# Patient Record
Sex: MALE | Race: WHITE | NOT HISPANIC OR LATINO | Employment: OTHER | URBAN - NONMETROPOLITAN AREA
[De-identification: names, ages, dates, MRNs, and addresses within clinical notes are randomized per-mention and may not be internally consistent; named-entity substitution may affect disease eponyms.]

---

## 2017-07-25 ENCOUNTER — OFFICE VISIT (OUTPATIENT)
Dept: GASTROENTEROLOGY | Facility: CLINIC | Age: 57
End: 2017-07-25

## 2017-07-25 VITALS
HEART RATE: 105 BPM | DIASTOLIC BLOOD PRESSURE: 90 MMHG | WEIGHT: 260 LBS | BODY MASS INDEX: 34.46 KG/M2 | HEIGHT: 73 IN | TEMPERATURE: 97.8 F | SYSTOLIC BLOOD PRESSURE: 122 MMHG | OXYGEN SATURATION: 97 %

## 2017-07-25 DIAGNOSIS — Z83.71 FAMILY HX COLONIC POLYPS: ICD-10-CM

## 2017-07-25 DIAGNOSIS — R19.5 HEME POSITIVE STOOL: ICD-10-CM

## 2017-07-25 DIAGNOSIS — I10 ESSENTIAL HYPERTENSION: ICD-10-CM

## 2017-07-25 DIAGNOSIS — Z86.010 HX OF COLONIC POLYP: Primary | ICD-10-CM

## 2017-07-25 PROCEDURE — 99203 OFFICE O/P NEW LOW 30 MIN: CPT | Performed by: NURSE PRACTITIONER

## 2017-07-25 RX ORDER — IRBESARTAN 150 MG/1
TABLET ORAL
Refills: 5 | COMMUNITY
Start: 2017-07-05

## 2017-07-25 RX ORDER — LORATADINE 10 MG/1
10 TABLET ORAL DAILY
COMMUNITY
End: 2020-08-11

## 2017-07-25 NOTE — PROGRESS NOTES
[facility] GASTROENTEROLOGY - OFFICE NOTE    7/25/2017    Daniel Euceda   1960    Primary Physician: Erasmo Oswald PA-C    Chief Complaint   Patient presents with   • GI Bleeding     poss stool cards         HISTORY OF PRESENT ILLNESS    Daniel Euceda is a 56 y.o. male presents  with  Heme pos stool noted on rectal exam 5/2017, by pcp.  No sign of active gi bleeding.  Has occasional loose stools if eats tomatoes.   No abdominal pain. No nausea or vomiting.  No fever. No weight loss.  Last colonoscopy was 7/2011, noting polyps, recall rec 3 yrs. There is family h/o colon polyps.     Past Medical History:   Diagnosis Date   • Asthma    • Colon polyp    • GERD (gastroesophageal reflux disease)    • Hypertension    • Prostatitis        Past Surgical History:   Procedure Laterality Date   • COLONOSCOPY  02/07/2011       Outpatient Prescriptions Marked as Taking for the 7/25/17 encounter (Office Visit) with JESSICA Quintanilla   Medication Sig Dispense Refill   • esomeprazole (nexIUM) 20 MG capsule Take 20 mg by mouth Every Morning Before Breakfast.     • irbesartan (AVAPRO) 150 MG tablet TAKE 1 TABLET BY MOUTH DAILY  5   • loratadine (ALLERGY) 10 MG tablet Take 10 mg by mouth Daily.         No Known Allergies    Social History     Social History   • Marital status:      Spouse name: N/A   • Number of children: N/A   • Years of education: N/A     Occupational History   • Not on file.     Social History Main Topics   • Smoking status: Former Smoker   • Smokeless tobacco: Never Used   • Alcohol use Yes      Comment: rare   • Drug use: No   • Sexual activity: Not on file     Other Topics Concern   • Not on file     Social History Narrative       Family History   Problem Relation Age of Onset   • Colon polyps Mother    • Colon cancer Neg Hx        Review of Systems   Constitutional: Negative for appetite change, chills, fatigue, fever and unexpected weight change.   HENT: Negative for sore throat and  "trouble swallowing.    Respiratory: Negative for cough, chest tightness, shortness of breath and wheezing.    Cardiovascular: Negative for chest pain and palpitations.   Gastrointestinal: Negative for abdominal distention, abdominal pain, anal bleeding, blood in stool, constipation, nausea, rectal pain and vomiting.        As mentioned in hpi   Genitourinary: Negative for difficulty urinating, dysuria and hematuria.   Musculoskeletal: Negative for arthralgias and back pain.   Skin: Negative for color change and rash.   Neurological: Negative for dizziness, syncope, light-headedness and headaches.   Psychiatric/Behavioral: Negative for confusion. The patient is not nervous/anxious.        Vitals:    07/25/17 1402   BP: 122/90   BP Location: Left arm   Patient Position: Sitting   Cuff Size: Adult   Pulse: 105   Temp: 97.8 °F (36.6 °C)   SpO2: 97%   Weight: 260 lb (118 kg)   Height: 73\" (185.4 cm)      Body mass index is 34.3 kg/(m^2).    Physical Exam   Constitutional: He appears well-developed and well-nourished. No distress.   HENT:   Head: Normocephalic and atraumatic.   Eyes: EOM are normal. No scleral icterus.   Neck: Normal range of motion. Neck supple. No JVD present. No tracheal deviation present.   Cardiovascular: Normal rate and regular rhythm.    Pulmonary/Chest: Effort normal and breath sounds normal. No stridor.   Abdominal: Soft. Bowel sounds are normal. He exhibits no distension and no mass. There is no tenderness. There is no rebound and no guarding.   Musculoskeletal: He exhibits no edema.   Neurological: He is alert.   Skin: Skin is warm and dry. No rash noted.   Psychiatric: He has a normal mood and affect. His behavior is normal.   Vitals reviewed.      No results found for this or any previous visit.        ASSESSMENT AND PLAN    Daniel was seen today for gi bleeding.    Diagnoses and all orders for this visit:    Hx of colonic polyp    Heme positive stool  -     Case Request; Standing  -     Case " Request    Family hx colonic polyps    Essential hypertension    Other orders  -     Implement Anesthesia Orders Day of Procedure; Standing  -     Obtain Informed Consent; Standing  -     Sod Picosulfate-Mag Ox-Cit Acd (PREPOPIK) 10-3.5-12 MG-GM-GM pack; Take 1 container by mouth 1 (One) Time for 1 dose. Take as directed per instruction sheet    plan for colonoscopy. Instructed to take heart or b/p med am of procedure if that is when he normally takes.     COLONOSCOPY WITH ANESTHESIA (N/A)   All risks, benefits, alternatives, and indications of colonoscopy procedure have been discussed with the patient. Risks to include perforation of the colon requiring possible surgery or colostomy, risk of bleeding from biopsies or removal of colon tissue, possibility of missing a colon polyp or cancer, or adverse drug reaction.  Benefits to include the diagnosis and management of disease of the colon and rectum. Alternatives to include barium enema, radiographic evaluation, lab testing or no intervention. Pt verbalizes understanding and agrees.         Body mass index is 34.3 kg/(m^2).         JESSICA Oliveros Dragon/transcription disclaimer:  Much of this encounter note is electronic transcription/translation of spoken language to printed text.  The electronic translation of spoken language may be erroneous, or at times, nonsensical words or phrases may be inadvertently transcribed.  Although I have reviewed the note for such errors, some may still exist.

## 2017-08-30 ENCOUNTER — HOSPITAL ENCOUNTER (OUTPATIENT)
Facility: HOSPITAL | Age: 57
Setting detail: HOSPITAL OUTPATIENT SURGERY
Discharge: HOME | End: 2017-08-30
Attending: INTERNAL MEDICINE | Admitting: INTERNAL MEDICINE

## 2017-08-30 ENCOUNTER — ANESTHESIA (OUTPATIENT)
Dept: GASTROENTEROLOGY | Facility: HOSPITAL | Age: 57
End: 2017-08-30

## 2017-08-30 ENCOUNTER — ANESTHESIA EVENT (OUTPATIENT)
Dept: GASTROENTEROLOGY | Facility: HOSPITAL | Age: 57
End: 2017-08-30

## 2017-08-30 VITALS
HEART RATE: 82 BPM | DIASTOLIC BLOOD PRESSURE: 94 MMHG | WEIGHT: 254 LBS | HEIGHT: 73 IN | OXYGEN SATURATION: 98 % | RESPIRATION RATE: 16 BRPM | SYSTOLIC BLOOD PRESSURE: 127 MMHG | BODY MASS INDEX: 33.66 KG/M2 | TEMPERATURE: 98.1 F

## 2017-08-30 DIAGNOSIS — R19.5 HEME POSITIVE STOOL: ICD-10-CM

## 2017-08-30 PROCEDURE — 45385 COLONOSCOPY W/LESION REMOVAL: CPT | Performed by: INTERNAL MEDICINE

## 2017-08-30 PROCEDURE — 88305 TISSUE EXAM BY PATHOLOGIST: CPT | Performed by: INTERNAL MEDICINE

## 2017-08-30 PROCEDURE — 25010000002 PROPOFOL 10 MG/ML EMULSION: Performed by: NURSE ANESTHETIST, CERTIFIED REGISTERED

## 2017-08-30 DEVICE — DEV CLIP ENDO RESOLUTION360 CONTRL ROT 235CM: Type: IMPLANTABLE DEVICE | Status: FUNCTIONAL

## 2017-08-30 RX ORDER — SODIUM CHLORIDE 0.9 % (FLUSH) 0.9 %
3 SYRINGE (ML) INJECTION AS NEEDED
Status: DISCONTINUED | OUTPATIENT
Start: 2017-08-30 | End: 2017-08-30 | Stop reason: HOSPADM

## 2017-08-30 RX ORDER — SODIUM CHLORIDE 9 MG/ML
100 INJECTION, SOLUTION INTRAVENOUS CONTINUOUS
Status: CANCELLED | OUTPATIENT
Start: 2017-08-30

## 2017-08-30 RX ORDER — SODIUM CHLORIDE 9 MG/ML
500 INJECTION, SOLUTION INTRAVENOUS CONTINUOUS PRN
Status: DISCONTINUED | OUTPATIENT
Start: 2017-08-30 | End: 2017-08-30 | Stop reason: HOSPADM

## 2017-08-30 RX ORDER — ONDANSETRON 2 MG/ML
4 INJECTION INTRAMUSCULAR; INTRAVENOUS ONCE AS NEEDED
Status: DISCONTINUED | OUTPATIENT
Start: 2017-08-30 | End: 2017-08-30 | Stop reason: HOSPADM

## 2017-08-30 RX ORDER — PROPOFOL 10 MG/ML
VIAL (ML) INTRAVENOUS AS NEEDED
Status: DISCONTINUED | OUTPATIENT
Start: 2017-08-30 | End: 2017-08-30 | Stop reason: SURG

## 2017-08-30 RX ORDER — SODIUM CHLORIDE 0.9 % (FLUSH) 0.9 %
1-10 SYRINGE (ML) INJECTION AS NEEDED
Status: CANCELLED | OUTPATIENT
Start: 2017-08-30

## 2017-08-30 RX ORDER — LIDOCAINE HYDROCHLORIDE 10 MG/ML
0.5 INJECTION, SOLUTION INFILTRATION; PERINEURAL ONCE AS NEEDED
Status: COMPLETED | OUTPATIENT
Start: 2017-08-30 | End: 2017-08-30

## 2017-08-30 RX ADMIN — PROPOFOL 40 MG: 10 INJECTION, EMULSION INTRAVENOUS at 09:46

## 2017-08-30 RX ADMIN — PROPOFOL 20 MG: 10 INJECTION, EMULSION INTRAVENOUS at 10:00

## 2017-08-30 RX ADMIN — PROPOFOL 40 MG: 10 INJECTION, EMULSION INTRAVENOUS at 09:51

## 2017-08-30 RX ADMIN — PROPOFOL 20 MG: 10 INJECTION, EMULSION INTRAVENOUS at 09:57

## 2017-08-30 RX ADMIN — PROPOFOL 20 MG: 10 INJECTION, EMULSION INTRAVENOUS at 09:54

## 2017-08-30 RX ADMIN — PROPOFOL 60 MG: 10 INJECTION, EMULSION INTRAVENOUS at 09:43

## 2017-08-30 RX ADMIN — PROPOFOL 40 MG: 10 INJECTION, EMULSION INTRAVENOUS at 09:49

## 2017-08-30 RX ADMIN — LIDOCAINE HYDROCHLORIDE 0.5 ML: 10 INJECTION, SOLUTION INFILTRATION; PERINEURAL at 09:11

## 2017-08-30 RX ADMIN — SODIUM CHLORIDE 500 ML: 9 INJECTION, SOLUTION INTRAVENOUS at 09:11

## 2017-08-30 NOTE — ANESTHESIA POSTPROCEDURE EVALUATION
Patient: Daniel Euceda    Procedure Summary     Date Anesthesia Start Anesthesia Stop Room / Location    08/30/17 0940 [facility] ENDOSCOPY 6 / [facility]       Procedure Diagnosis Surgeon Provider    COLONOSCOPY WITH ANESTHESIA (N/A ) Heme positive stool  (Heme positive stool [R19.5]) MD Edgar Raines, ELIZABETH          Anesthesia Type: general  Last vitals  BP        Temp        Pulse       Resp        SpO2          Post Anesthesia Care and Evaluation    Patient location during evaluation: PHASE II  Patient participation: complete - patient participated  Level of consciousness: awake and alert  Pain score: 0  Pain management: adequate  Airway patency: patent  Anesthetic complications: No anesthetic complications  PONV Status: none  Cardiovascular status: acceptable and stable  Respiratory status: acceptable and unassisted  Hydration status: acceptable

## 2017-08-30 NOTE — ANESTHESIA PREPROCEDURE EVALUATION
Anesthesia Evaluation     Patient summary reviewed and Nursing notes reviewed   no history of anesthetic complications:  NPO Solid Status: > 8 hours  NPO Liquid Status: > 4 hours     Airway   Mallampati: I  TM distance: >3 FB  Neck ROM: full  no difficulty expected  Dental - normal exam     Pulmonary    (+) asthma (Hasn't used rescue inhaler in years),   (-) COPD, recent URI, sleep apnea, not a smoker  Cardiovascular   Exercise tolerance: good (4-7 METS)    Rhythm: regular  Rate: normal    (+) hypertension well controlled,   (-) pacemaker, past MI, angina, cardiac stents, CABG      Neuro/Psych  (-) seizures, TIA, CVA  GI/Hepatic/Renal/Endo    (+)  GERD well controlled,   (-) liver disease, no renal disease, diabetes, hypothyroidism, hyperthyroidism    Musculoskeletal     (-) neck pain, neck stiffness  Abdominal    Substance History      OB/GYN          Other                                        Anesthesia Plan    ASA 2     general     intravenous induction   Anesthetic plan and risks discussed with patient.

## 2017-09-01 LAB
CYTO UR: NORMAL
LAB AP CASE REPORT: NORMAL
LAB AP CLINICAL INFORMATION: NORMAL
Lab: NORMAL
PATH REPORT.FINAL DX SPEC: NORMAL
PATH REPORT.GROSS SPEC: NORMAL

## 2018-04-16 ENCOUNTER — PROCEDURE VISIT (OUTPATIENT)
Dept: OTOLARYNGOLOGY | Facility: CLINIC | Age: 58
End: 2018-04-16

## 2018-04-16 ENCOUNTER — OFFICE VISIT (OUTPATIENT)
Dept: OTOLARYNGOLOGY | Facility: CLINIC | Age: 58
End: 2018-04-16

## 2018-04-16 VITALS
HEART RATE: 68 BPM | TEMPERATURE: 98.9 F | SYSTOLIC BLOOD PRESSURE: 143 MMHG | DIASTOLIC BLOOD PRESSURE: 88 MMHG | WEIGHT: 268 LBS | HEIGHT: 73 IN | BODY MASS INDEX: 35.52 KG/M2

## 2018-04-16 DIAGNOSIS — H93.11 TINNITUS OF RIGHT EAR: ICD-10-CM

## 2018-04-16 DIAGNOSIS — H90.41 SENSORINEURAL HEARING LOSS (SNHL) OF RIGHT EAR WITH UNRESTRICTED HEARING OF LEFT EAR: Primary | ICD-10-CM

## 2018-04-16 DIAGNOSIS — H90.3 ASYMMETRICAL SENSORINEURAL HEARING LOSS: Primary | ICD-10-CM

## 2018-04-16 PROCEDURE — 92557 COMPREHENSIVE HEARING TEST: CPT | Performed by: AUDIOLOGIST-HEARING AID FITTER

## 2018-04-16 PROCEDURE — 99214 OFFICE O/P EST MOD 30 MIN: CPT | Performed by: NURSE PRACTITIONER

## 2018-04-16 PROCEDURE — 92568 ACOUSTIC REFL THRESHOLD TST: CPT | Performed by: AUDIOLOGIST-HEARING AID FITTER

## 2018-04-16 NOTE — PATIENT INSTRUCTIONS
(1) See the medical provider as scheduled due to the unilateral symptoms. Imaging studies may be warranted to rule out retrocochlear pathology.  (2) Receive audiological testing annually, sooner if needed.

## 2018-04-16 NOTE — PROGRESS NOTES
YOB: 1960  Location: [facility]  Location Address: [address] [place] [address], [facility], [address] [place] [address]  Location Phone: [phone]    Chief Complaint   Patient presents with   • Tinnitus       History of Present Illness  Daniel Euceda is a 57 y.o. male.  Daniel Euceda is here for evaluation of ENT complaints. The patient has had problems with decreased hearing and tinnitus  The symptoms are not localized to a particular location. The patient has had moderate symptoms. The symptoms have been present for the last several years The symptoms are aggravated by  no identifiable factors. The symptoms are improved by no identifiable factors.  He reports worsening right sided tinnitus, hearing loss gradually.  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  He denies headaches, vertigo, otalgia.     Procedure visit     2018  [facility]   YOEL Alatorre   Audiology   Sensorineural hearing loss (SNHL) of right ear with unrestricted hearing of left ear +1 more   Dx   Hearing Loss, Tinnitus; Referred by Erasmo Oswald PA-C   Reason for Visit    Progress Notes                      Past Medical History:   Diagnosis Date   • Asthma    • Colon polyp    • GERD (gastroesophageal reflux disease)    • Hypertension    • Prostatitis    • Tinnitus        Past Surgical History:   Procedure Laterality Date   • COLONOSCOPY  2011   • COLONOSCOPY N/A 2017    Procedure: COLONOSCOPY WITH ANESTHESIA;  Surgeon: Carroll Byrnes MD;  Location: [facility] ENDOSCOPY;  Service:    • EYE SURGERY         Outpatient Prescriptions Marked as Taking for the 18 encounter (Office Visit) with JESSICA Delvalle   Medication Sig Dispense Refill   • esomeprazole (nexIUM) 20 MG capsule Take 20 mg by mouth Every Morning Before Breakfast.     • irbesartan (AVAPRO) 150 MG tablet TAKE 1 TABLET BY MOUTH DAILY  5   • loratadine (ALLERGY) 10 MG tablet Take 10 mg by mouth Daily.          Review of patient's allergies indicates no known allergies.    Family History   Problem Relation Age of Onset   • Colon polyps Mother    • Colon cancer Neg Hx        Social History     Social History   • Marital status:      Spouse name: N/A   • Number of children: N/A   • Years of education: N/A     Occupational History   • Not on file.     Social History Main Topics   • Smoking status: Former Smoker     Types: Cigarettes   • Smokeless tobacco: Never Used   • Alcohol use Yes      Comment: rare   • Drug use: No   • Sexual activity: Not on file     Other Topics Concern   • Not on file     Social History Narrative   • No narrative on file       Review of Systems   Constitutional: Negative.    HENT:        SEE HPI   Eyes: Negative.    Respiratory: Negative.    Cardiovascular: Negative.    Gastrointestinal: Negative.    Endocrine: Negative.    Genitourinary: Negative.    Musculoskeletal: Negative.    Skin: Negative.    Allergic/Immunologic: Negative.    Neurological: Negative.    Hematological: Negative.    Psychiatric/Behavioral: Negative.        Vitals:    04/16/18 1404   BP: 143/88   Pulse: 68   Temp: 98.9 °F (37.2 °C)       Body mass index is 35.36 kg/m².    Objective     Physical Exam  CONSTITUTIONAL: well nourished, alert, oriented, in no acute distress     COMMUNICATION AND VOICE: able to communicate normally, normal voice quality    HEAD: normocephalic, no lesions, atraumatic, no tenderness, no masses     FACE: appearance normal, no lesions, no tenderness, no deformities, facial motion symmetric    SALIVARY GLANDS: parotid glands with no tenderness, no swelling, no masses, submandibular glands with normal size, nontender    EYES: ocular motility normal, eyelids normal, orbits normal, no proptosis, conjunctiva normal , pupils equal, round     EARS:  Hearing: response to conversational voice normal bilaterally   External Ears: auricles without lesions  Otoscopic: tympanic membrane appearance normal,  no lesions, no perforation, normal mobility, no fluid    NOSE:  External Nose: structure normal, no tenderness on palpation, no nasal discharge, no lesions, no evidence of trauma, nostrils patent   Intranasal Exam: nasal mucosa normal, vestibule within normal limits, inferior turbinate normal, nasal septum midline     ORAL:  Lips: upper and lower lips without lesion   Teeth: dentition within normal limits for age   Gums: gingivae healthy   Oral Mucosa: oral mucosa normal, no mucosal lesions   Floor of Mouth: Warthin’s duct patent, mucosa normal  Tongue: lingual mucosa normal without lesions, normal tongue mobility   Palate: soft and hard palates with normal mucosa and structure  Oropharynx: oropharyngeal mucosa normal    NECK: neck appearance normal, no mass,  noted without erythema or tenderness    THYROID: no overt thyromegaly, no tenderness, nodules or mass present on palpation, position midline     LYMPH NODES: no lymphadenopathy    CHEST/RESPIRATORY: respiratory effort normal    CARDIOVASCULAR:  extremities without cyanosis or edema      NEUROLOGIC/PSYCHIATRIC: oriented to time, place and person, mood normal, affect appropriate, CN II-XII intact grossly    Assessment/Plan   Daniel was seen today for tinnitus.    Diagnoses and all orders for this visit:    Asymmetrical sensorineural hearing loss  -     Comprehensive Hearing Test; Future    Tinnitus of right ear      * Surgery not found *  Orders Placed This Encounter   Procedures   • Comprehensive Hearing Test     Standing Status:   Future     Standing Expiration Date:   4/16/2019     Order Specific Question:   Laterality     Answer:   Bilateral     Return in about 1 year (around 4/16/2019).       Patient Instructions   Avoid loud noise exposure. Protect hearing in with extreme loud noise.   Recommend fan, sound machine, white noise from TV for tinnitus masking. Avoid excessive caffeine, decrease stress level, monitor BP regularly, routine sleep schedule. Low  Sodium Diet.   Tinnitus Handout   Hearing loss handout   Appt with hearing aid specialist for hearing aid fitting when patient so desires.   Call for change or worsening symptoms not controlled by current treatment regimen.        Tinnitus  Tinnitus refers to hearing a sound when there is no actual source for that sound. This is often described as ringing in the ears. However, people with this condition may hear a variety of noises. A person may hear the sound in one ear or in both ears.  The sounds of tinnitus can be soft, loud, or somewhere in between. Tinnitus can last for a few seconds or can be constant for days. It may go away without treatment and come back at various times. When tinnitus is constant or happens often, it can lead to other problems, such as trouble sleeping and trouble concentrating.  Almost everyone experiences tinnitus at some point. Tinnitus that is long-lasting (chronic) or comes back often is a problem that may require medical attention.  What are the causes?  The cause of tinnitus is often not known. In some cases, it can result from other problems or conditions, including:  · Exposure to loud noises from machinery, music, or other sources.  · Hearing loss.  · Ear or sinus infections.  · Earwax buildup.  · A foreign object in the ear.  · Use of certain medicines.  · Use of alcohol and caffeine.  · High blood pressure.  · Heart diseases.  · Anemia.  · Allergies.  · Meniere disease.  · Thyroid problems.  · Tumors.  · An enlarged part of a weakened blood vessel (aneurysm).  What are the signs or symptoms?  The main symptom of tinnitus is hearing a sound when there is no source for that sound. It may sound like:  · Buzzing.  · Roaring.  · Ringing.  · Blowing air, similar to the sound heard when you listen to a seashell.  · Hissing.  · Whistling.  · Sizzling.  · Humming.  · Running water.  · A sustained musical note.  How is this diagnosed?  Tinnitus is diagnosed based on your symptoms. Your  health care provider will do a physical exam. A comprehensive hearing exam (audiologic exam) will be done if your tinnitus:  · Affects only one ear (unilateral).  · Causes hearing difficulties.  · Lasts 6 months or longer.  You may also need to see a health care provider who specializes in hearing disorders (audiologist). You may be asked to complete a questionnaire to determine the severity of your tinnitus. Tests may be done to help determine the cause and to rule out other conditions. These can include:  · Imaging studies of your head and brain, such as:  ¨ A CT scan.  ¨ An MRI.  · An imaging study of your blood vessels (angiogram).  How is this treated?  Treating an underlying medical condition can sometimes make tinnitus go away. If your tinnitus continues, other treatments may include:  · Medicines, such as certain antidepressants or sleeping aids.  · Sound generators to mask the tinnitus. These include:  ¨ Tabletop sound machines that play relaxing sounds to help you fall asleep.  ¨ Wearable devices that fit in your ear and play sounds or music.  ¨ A small device that uses headphones to deliver a signal embedded in music (acoustic neural stimulation). In time, this may change the pathways of your brain and make you less sensitive to tinnitus. This device is used for very severe cases when no other treatment is working.  · Therapy and counseling to help you manage the stress of living with tinnitus.  · Using hearing aids or cochlear implants, if your tinnitus is related to hearing loss.  Follow these instructions at home:  · When possible, avoid being in loud places and being exposed to loud sounds.  · Wear hearing protection, such as earplugs, when you are exposed to loud noises.  · Do not take stimulants, such as nicotine, alcohol, or caffeine.  · Practice techniques for reducing stress, such as meditation, yoga, or deep breathing.  · Use a white noise machine, a humidifier, or other devices to mask the  sound of tinnitus.  · Sleep with your head slightly raised. This may reduce the impact of tinnitus.  · Try to get plenty of rest each night.  Contact a health care provider if:  · You have tinnitus in just one ear.  · Your tinnitus continues for 3 weeks or longer without stopping.  · Home care measures are not helping.  · You have tinnitus after a head injury.  · You have tinnitus along with any of the following:  ¨ Dizziness.  ¨ Loss of balance.  ¨ Nausea and vomiting.  This information is not intended to replace advice given to you by your health care provider. Make sure you discuss any questions you have with your health care provider.  Document Released: 12/18/2006 Document Revised: 08/20/2017 Document Reviewed: 05/20/2015  [employer] Interactive Patient Education © 2017 [employer]      Recommend MRI BRAIN/IACs - pt defers at this time.      Call for problems or worsening symptoms    Avoid excessive caffeine intake.  Limit loud noise exposure    Avoid loud noise exposure. Protect hearing in with extreme loud noise.   Recommend fan, sound machine, white noise from TV for tinnitus masking. Avoid excessive caffeine, decrease stress level, monitor BP regularly, routine sleep schedule. Low Sodium Diet.   Tinnitus Handout   Hearing loss handout   Appt with hearing aid specialist for hearing aid fitting when patient so desires.   Call for change or worsening symptoms not controlled by current treatment regimen.

## 2018-04-16 NOTE — PATIENT INSTRUCTIONS
Avoid loud noise exposure. Protect hearing in with extreme loud noise.   Recommend fan, sound machine, white noise from TV for tinnitus masking. Avoid excessive caffeine, decrease stress level, monitor BP regularly, routine sleep schedule. Low Sodium Diet.   Tinnitus Handout   Hearing loss handout   Appt with hearing aid specialist for hearing aid fitting when patient so desires.   Call for change or worsening symptoms not controlled by current treatment regimen.        Tinnitus  Tinnitus refers to hearing a sound when there is no actual source for that sound. This is often described as ringing in the ears. However, people with this condition may hear a variety of noises. A person may hear the sound in one ear or in both ears.  The sounds of tinnitus can be soft, loud, or somewhere in between. Tinnitus can last for a few seconds or can be constant for days. It may go away without treatment and come back at various times. When tinnitus is constant or happens often, it can lead to other problems, such as trouble sleeping and trouble concentrating.  Almost everyone experiences tinnitus at some point. Tinnitus that is long-lasting (chronic) or comes back often is a problem that may require medical attention.  What are the causes?  The cause of tinnitus is often not known. In some cases, it can result from other problems or conditions, including:  · Exposure to loud noises from machinery, music, or other sources.  · Hearing loss.  · Ear or sinus infections.  · Earwax buildup.  · A foreign object in the ear.  · Use of certain medicines.  · Use of alcohol and caffeine.  · High blood pressure.  · Heart diseases.  · Anemia.  · Allergies.  · Meniere disease.  · Thyroid problems.  · Tumors.  · An enlarged part of a weakened blood vessel (aneurysm).  What are the signs or symptoms?  The main symptom of tinnitus is hearing a sound when there is no source for that sound. It may sound  like:  · Buzzing.  · Roaring.  · Ringing.  · Blowing air, similar to the sound heard when you listen to a seashell.  · Hissing.  · Whistling.  · Sizzling.  · Humming.  · Running water.  · A sustained musical note.  How is this diagnosed?  Tinnitus is diagnosed based on your symptoms. Your health care provider will do a physical exam. A comprehensive hearing exam (audiologic exam) will be done if your tinnitus:  · Affects only one ear (unilateral).  · Causes hearing difficulties.  · Lasts 6 months or longer.  You may also need to see a health care provider who specializes in hearing disorders (audiologist). You may be asked to complete a questionnaire to determine the severity of your tinnitus. Tests may be done to help determine the cause and to rule out other conditions. These can include:  · Imaging studies of your head and brain, such as:  ¨ A CT scan.  ¨ An MRI.  · An imaging study of your blood vessels (angiogram).  How is this treated?  Treating an underlying medical condition can sometimes make tinnitus go away. If your tinnitus continues, other treatments may include:  · Medicines, such as certain antidepressants or sleeping aids.  · Sound generators to mask the tinnitus. These include:  ¨ Tabletop sound machines that play relaxing sounds to help you fall asleep.  ¨ Wearable devices that fit in your ear and play sounds or music.  ¨ A small device that uses headphones to deliver a signal embedded in music (acoustic neural stimulation). In time, this may change the pathways of your brain and make you less sensitive to tinnitus. This device is used for very severe cases when no other treatment is working.  · Therapy and counseling to help you manage the stress of living with tinnitus.  · Using hearing aids or cochlear implants, if your tinnitus is related to hearing loss.  Follow these instructions at home:  · When possible, avoid being in loud places and being exposed to loud sounds.  · Wear hearing  protection, such as earplugs, when you are exposed to loud noises.  · Do not take stimulants, such as nicotine, alcohol, or caffeine.  · Practice techniques for reducing stress, such as meditation, yoga, or deep breathing.  · Use a white noise machine, a humidifier, or other devices to mask the sound of tinnitus.  · Sleep with your head slightly raised. This may reduce the impact of tinnitus.  · Try to get plenty of rest each night.  Contact a health care provider if:  · You have tinnitus in just one ear.  · Your tinnitus continues for 3 weeks or longer without stopping.  · Home care measures are not helping.  · You have tinnitus after a head injury.  · You have tinnitus along with any of the following:  ¨ Dizziness.  ¨ Loss of balance.  ¨ Nausea and vomiting.  This information is not intended to replace advice given to you by your health care provider. Make sure you discuss any questions you have with your health care provider.  Document Released: 12/18/2006 Document Revised: 08/20/2017 Document Reviewed: 05/20/2015  [employer] Interactive Patient Education © 2017 [employer]      Recommend MRI BRAIN/IACs - pt defers at this time.      Call for problems or worsening symptoms    Avoid excessive caffeine intake.  Limit loud noise exposure    Avoid loud noise exposure. Protect hearing in with extreme loud noise.   Recommend fan, sound machine, white noise from TV for tinnitus masking. Avoid excessive caffeine, decrease stress level, monitor BP regularly, routine sleep schedule. Low Sodium Diet.   Tinnitus Handout   Hearing loss handout   Appt with hearing aid specialist for hearing aid fitting when patient so desires.   Call for change or worsening symptoms not controlled by current treatment regimen.

## 2020-08-11 ENCOUNTER — OFFICE VISIT (OUTPATIENT)
Dept: GASTROENTEROLOGY | Facility: CLINIC | Age: 60
End: 2020-08-11

## 2020-08-11 VITALS
HEART RATE: 66 BPM | BODY MASS INDEX: 35.12 KG/M2 | HEIGHT: 73 IN | WEIGHT: 265 LBS | DIASTOLIC BLOOD PRESSURE: 86 MMHG | OXYGEN SATURATION: 99 % | TEMPERATURE: 97.7 F | SYSTOLIC BLOOD PRESSURE: 136 MMHG

## 2020-08-11 DIAGNOSIS — Z86.010 HISTORY OF ADENOMATOUS POLYP OF COLON: Primary | ICD-10-CM

## 2020-08-11 DIAGNOSIS — Z86.010 HX OF COLONIC POLYP: ICD-10-CM

## 2020-08-11 DIAGNOSIS — I10 ESSENTIAL HYPERTENSION: ICD-10-CM

## 2020-08-11 PROBLEM — Z86.0101 HISTORY OF ADENOMATOUS POLYP OF COLON: Status: ACTIVE | Noted: 2020-08-11

## 2020-08-11 PROBLEM — Z86.0100 HX OF COLONIC POLYP: Status: ACTIVE | Noted: 2020-08-11

## 2020-08-11 PROCEDURE — S0285 CNSLT BEFORE SCREEN COLONOSC: HCPCS | Performed by: NURSE PRACTITIONER

## 2020-08-11 NOTE — PROGRESS NOTES
[facility] Gastroenterology    Primary Physician Oswald, Erasmo Parra PA-C    8/11/2020    Daniel Euceda   1960      Chief Complaint   Patient presents with   • Colon Cancer Screening     last colon 08/2017- hx of polyps       Subjective     HPI    Daniel Euceda is a 59 y.o. male who presents as a referral for preventative maintenance. He has no complaints of nausea or vomiting. No change in bowels. No wt loss. No BRBPR. No melena. No abdominal pain.        Last colonoscopy was 8/2017 noting 3 polyps ( path tubular adenomatous and hyperplastic) and sigmoid diverticulosis.  The patient does have history of colon polyps. The patient does not have history of colon cancer.   There is family history of colon polyps mother. There is not a family history of colon cancer.     Past Medical History:   Diagnosis Date   • Asthma    • Colon polyp    • GERD (gastroesophageal reflux disease)    • Hypertension    • Prostatitis    • Tinnitus        Past Surgical History:   Procedure Laterality Date   • COLONOSCOPY  02/07/2011   • COLONOSCOPY N/A 8/30/2017    Procedure: COLONOSCOPY WITH ANESTHESIA;  Surgeon: Carroll Byrnes MD;  Location: [facility] ENDOSCOPY;  Service:    • EYE SURGERY         Outpatient Medications Marked as Taking for the 8/11/20 encounter (Office Visit) with Arabella Sanford APRN   Medication Sig Dispense Refill   • esomeprazole (nexIUM) 20 MG capsule Take 20 mg by mouth Every Morning Before Breakfast.     • irbesartan (AVAPRO) 150 MG tablet TAKE 1 TABLET BY MOUTH DAILY  5   • [DISCONTINUED] loratadine (ALLERGY) 10 MG tablet Take 10 mg by mouth Daily.         No Known Allergies    Social History     Socioeconomic History   • Marital status:      Spouse name: Not on file   • Number of children: Not on file   • Years of education: Not on file   • Highest education level: Not on file   Tobacco Use   • Smoking status: Former Smoker     Types: Cigarettes   • Smokeless tobacco: Never Used   Substance  and Sexual Activity   • Alcohol use: Not Currently     Comment: rare   • Drug use: No       Family History   Problem Relation Age of Onset   • Colon polyps Mother    • Colon cancer Neg Hx        Review of Systems   Constitutional: Negative for chills, fever and unexpected weight change.   Respiratory: Negative for cough, shortness of breath and wheezing.    Cardiovascular: Negative for chest pain and palpitations.   Gastrointestinal: Negative for abdominal distention, abdominal pain, anal bleeding, blood in stool, constipation, diarrhea, nausea and vomiting.       Objective     Vitals:    08/11/20 1001   BP: 136/86   Pulse: 66   Temp: 97.7 °F (36.5 °C)   SpO2: 99%         08/11/20  1001   Weight: 120 kg (265 lb)     Body mass index is 34.96 kg/m².    Physical Exam   Constitutional: No distress.   Cardiovascular: Normal rate, regular rhythm and normal heart sounds.   Pulmonary/Chest: Effort normal and breath sounds normal.   Abdominal: Soft. Bowel sounds are normal. He exhibits no distension. There is no tenderness.   Musculoskeletal: He exhibits no edema.   Neurological: He is alert.   Skin: Skin is warm and dry.   Vitals reviewed.      Imaging Results (Most Recent)     None          Assessment/Plan     Daniel was seen today for colon cancer screening.    Diagnoses and all orders for this visit:    History of adenomatous polyp of colon  -     Case Request; Standing    Hx of colonic polyp  -     Case Request; Standing    Essential hypertension    Other orders  -     Follow Anesthesia Guidelines / Protocol; Future  -     Obtain Informed Consent; Future  -     polyethylene glycol (Golytely) 236 g solution; Take 4,000 mL by mouth 1 (One) Time for 1 dose. Take as directed per instruction sheet.             Plan for colonoscopy. The patient was advised to take any blood pressure or heart  medications the morning of  procedure if that is when he/she normally takes.           Body mass index is 34.96 kg/m².    Patient's Body  mass index is 34.96 kg/m². BMI is above normal parameters. Recommendations include: recommend weight loss, no f/u .      COLONOSCOPY WITH ANESTHESIA (N/A)  All risks, benefits, alternatives, and indications of colonoscopy procedure have been discussed with the patient. Risks to include perforation of the colon requiring possible surgery or colostomy, risk of bleeding from biopsies or removal of colon tissue, possibility of missing a colon polyp or cancer, or adverse drug reaction.  Benefits to include the diagnosis and management of disease of the colon and rectum. Alternatives to include barium enema, radiographic evaluation, lab testing or no intervention. Pt verbalizes understanding and agrees.         JESSICA Oliveros      EMR Dragon/transcription disclaimer:  Much of this encounter note is electronic transcription/translation of spoken language to printed text.  The electronic translation of spoken language may be erroneous, or at times, nonsensical words or phrases may be inadvertently transcribed.  Although I have reviewed the note for such errors, some may still exist.

## 2020-08-18 ENCOUNTER — TRANSCRIBE ORDERS (OUTPATIENT)
Dept: ADMINISTRATIVE | Facility: HOSPITAL | Age: 60
End: 2020-08-18

## 2020-08-18 DIAGNOSIS — Z01.818 PRE-OP TESTING: Primary | ICD-10-CM

## 2020-08-24 ENCOUNTER — LAB (OUTPATIENT)
Dept: LAB | Facility: HOSPITAL | Age: 60
End: 2020-08-24

## 2020-08-24 PROCEDURE — C9803 HOPD COVID-19 SPEC COLLECT: HCPCS | Performed by: INTERNAL MEDICINE

## 2020-08-24 PROCEDURE — 87635 SARS-COV-2 COVID-19 AMP PRB: CPT | Performed by: INTERNAL MEDICINE

## 2020-08-25 LAB — SARS-COV-2 N GENE RESP QL NAA+PROBE: NOT DETECTED

## 2020-08-26 ENCOUNTER — ANESTHESIA EVENT (OUTPATIENT)
Dept: GASTROENTEROLOGY | Facility: HOSPITAL | Age: 60
End: 2020-08-26

## 2020-08-26 ENCOUNTER — ANESTHESIA (OUTPATIENT)
Dept: GASTROENTEROLOGY | Facility: HOSPITAL | Age: 60
End: 2020-08-26

## 2020-08-26 ENCOUNTER — HOSPITAL ENCOUNTER (OUTPATIENT)
Facility: HOSPITAL | Age: 60
Setting detail: HOSPITAL OUTPATIENT SURGERY
Discharge: HOME | End: 2020-08-26
Attending: INTERNAL MEDICINE | Admitting: INTERNAL MEDICINE

## 2020-08-26 VITALS
DIASTOLIC BLOOD PRESSURE: 96 MMHG | WEIGHT: 257 LBS | TEMPERATURE: 97.1 F | OXYGEN SATURATION: 94 % | RESPIRATION RATE: 18 BRPM | BODY MASS INDEX: 34.06 KG/M2 | HEART RATE: 75 BPM | HEIGHT: 73 IN | SYSTOLIC BLOOD PRESSURE: 124 MMHG

## 2020-08-26 PROCEDURE — 45385 COLONOSCOPY W/LESION REMOVAL: CPT | Performed by: INTERNAL MEDICINE

## 2020-08-26 PROCEDURE — 25010000002 PROPOFOL 10 MG/ML EMULSION: Performed by: NURSE ANESTHETIST, CERTIFIED REGISTERED

## 2020-08-26 RX ORDER — PROPOFOL 10 MG/ML
VIAL (ML) INTRAVENOUS AS NEEDED
Status: DISCONTINUED | OUTPATIENT
Start: 2020-08-26 | End: 2020-08-26 | Stop reason: SURG

## 2020-08-26 RX ORDER — SODIUM CHLORIDE 0.9 % (FLUSH) 0.9 %
10 SYRINGE (ML) INJECTION AS NEEDED
Status: DISCONTINUED | OUTPATIENT
Start: 2020-08-26 | End: 2020-08-26 | Stop reason: HOSPADM

## 2020-08-26 RX ORDER — LIDOCAINE HYDROCHLORIDE 10 MG/ML
0.5 INJECTION, SOLUTION EPIDURAL; INFILTRATION; INTRACAUDAL; PERINEURAL ONCE AS NEEDED
Status: CANCELLED | OUTPATIENT
Start: 2020-08-26

## 2020-08-26 RX ORDER — SODIUM CHLORIDE 9 MG/ML
500 INJECTION, SOLUTION INTRAVENOUS CONTINUOUS PRN
Status: DISCONTINUED | OUTPATIENT
Start: 2020-08-26 | End: 2020-08-26 | Stop reason: HOSPADM

## 2020-08-26 RX ORDER — ONDANSETRON 2 MG/ML
4 INJECTION INTRAMUSCULAR; INTRAVENOUS ONCE AS NEEDED
Status: DISCONTINUED | OUTPATIENT
Start: 2020-08-26 | End: 2020-08-26 | Stop reason: HOSPADM

## 2020-08-26 RX ADMIN — PROPOFOL 30 MG: 10 INJECTION, EMULSION INTRAVENOUS at 10:36

## 2020-08-26 RX ADMIN — PROPOFOL 30 MG: 10 INJECTION, EMULSION INTRAVENOUS at 10:35

## 2020-08-26 RX ADMIN — PROPOFOL 70 MG: 10 INJECTION, EMULSION INTRAVENOUS at 10:33

## 2020-08-26 RX ADMIN — PROPOFOL 40 MG: 10 INJECTION, EMULSION INTRAVENOUS at 10:34

## 2020-08-26 RX ADMIN — PROPOFOL 40 MG: 10 INJECTION, EMULSION INTRAVENOUS at 10:43

## 2020-08-26 RX ADMIN — SODIUM CHLORIDE 500 ML: 9 INJECTION, SOLUTION INTRAVENOUS at 10:03

## 2020-08-26 RX ADMIN — PROPOFOL 30 MG: 10 INJECTION, EMULSION INTRAVENOUS at 10:37

## 2020-08-26 RX ADMIN — LIDOCAINE HYDROCHLORIDE 100 MG: 20 INJECTION, SOLUTION INTRAVENOUS at 10:33

## 2020-08-26 NOTE — ANESTHESIA PREPROCEDURE EVALUATION
Anesthesia Evaluation     Patient summary reviewed   no history of anesthetic complications:  NPO Solid Status: > 8 hours  NPO Liquid Status: < 2 hours           Airway   Mallampati: III  TM distance: >3 FB  Neck ROM: full  Dental - normal exam     Pulmonary    (+) asthma,  (-) sleep apnea, not a smoker  Cardiovascular   Exercise tolerance: good (4-7 METS)    (+) hypertension,   (-) past MI, CAD, dysrhythmias, cardiac stents      Neuro/Psych  (-) seizures, TIA, CVA  GI/Hepatic/Renal/Endo    (+) obesity,  GERD,  diabetes mellitus (borderline),   (-) liver disease, no renal disease    Musculoskeletal     Abdominal    Substance History      OB/GYN          Other                        Anesthesia Plan    ASA 2     MAC     intravenous induction     Anesthetic plan, all risks, benefits, and alternatives have been provided, discussed and informed consent has been obtained with: patient.

## 2020-08-26 NOTE — ANESTHESIA POSTPROCEDURE EVALUATION
Patient: Daniel Euceda    Procedure Summary     Date:  08/26/20 Room / Location:   [facility] /  [facility]    Anesthesia Start:  1025 Anesthesia Stop:  1051    Procedure:  COLONOSCOPY WITH ANESTHESIA (N/A ) Diagnosis:       History of adenomatous polyp of colon      Hx of colonic polyp      (History of adenomatous polyp of colon [Z86.010])      (Hx of colonic polyp [Z86.010])    Surgeon:  Carroll Byrnes MD Provider:  Saul Brito CRNA    Anesthesia Type:  MAC ASA Status:  2          Anesthesia Type: MAC    Vitals  No vitals data found for the desired time range.          Post Anesthesia Care and Evaluation    Patient location during evaluation: PHASE II  Patient participation: complete - patient participated  Level of consciousness: awake  Pain score: 0  Pain management: adequate  Airway patency: patent  Anesthetic complications: No anesthetic complications  PONV Status: none  Cardiovascular status: acceptable  Respiratory status: acceptable  Hydration status: acceptable

## 2025-02-20 ENCOUNTER — PREP FOR SURGERY (OUTPATIENT)
Dept: OTHER | Facility: HOSPITAL | Age: 65
End: 2025-02-20
Payer: COMMERCIAL

## 2025-02-20 ENCOUNTER — TELEPHONE (OUTPATIENT)
Dept: GASTROENTEROLOGY | Facility: CLINIC | Age: 65
End: 2025-02-20

## 2025-02-20 DIAGNOSIS — Z86.0101 HISTORY OF ADENOMATOUS POLYP OF COLON: Primary | ICD-10-CM

## 2025-02-20 DIAGNOSIS — Z83.719 FAMILY HX COLONIC POLYPS: ICD-10-CM

## 2025-02-20 NOTE — TELEPHONE ENCOUNTER
I spoke with him this morning.  He denies change in bowel habits, rectal bleeding, chest pain/shortness of breath, or on blood thinners.  He does have history of adenomatous colon polyp.  His mother had colon polyps as well.  No family history of colon cancer.    Elsie, please call and schedule colonoscopy, MiraLAX prep.      All risks, benefits, alternatives, and indications of colonoscopy procedure have been discussed with the patient. Risks to include perforation of the colon requiring possible surgery or colostomy, risk of bleeding from biopsies or removal of colon tissue, possibility of missing a colon polyp or cancer, or adverse drug reaction.

## 2025-03-19 ENCOUNTER — HOSPITAL ENCOUNTER (OUTPATIENT)
Facility: HOSPITAL | Age: 65
Setting detail: HOSPITAL OUTPATIENT SURGERY
Discharge: HOME | End: 2025-03-19
Attending: INTERNAL MEDICINE | Admitting: INTERNAL MEDICINE
Payer: COMMERCIAL

## 2025-03-19 ENCOUNTER — ANESTHESIA (OUTPATIENT)
Dept: GASTROENTEROLOGY | Facility: HOSPITAL | Age: 65
End: 2025-03-19
Payer: COMMERCIAL

## 2025-03-19 ENCOUNTER — ANESTHESIA EVENT (OUTPATIENT)
Dept: GASTROENTEROLOGY | Facility: HOSPITAL | Age: 65
End: 2025-03-19
Payer: COMMERCIAL

## 2025-03-19 VITALS
HEART RATE: 68 BPM | TEMPERATURE: 96.7 F | HEIGHT: 73 IN | RESPIRATION RATE: 12 BRPM | OXYGEN SATURATION: 95 % | BODY MASS INDEX: 34.85 KG/M2 | DIASTOLIC BLOOD PRESSURE: 98 MMHG | WEIGHT: 263 LBS | SYSTOLIC BLOOD PRESSURE: 124 MMHG

## 2025-03-19 DIAGNOSIS — Z83.719 FAMILY HX COLONIC POLYPS: ICD-10-CM

## 2025-03-19 DIAGNOSIS — Z86.0101 HISTORY OF ADENOMATOUS POLYP OF COLON: ICD-10-CM

## 2025-03-19 PROCEDURE — 25810000003 SODIUM CHLORIDE 0.9 % SOLUTION

## 2025-03-19 PROCEDURE — 25010000002 PROPOFOL 10 MG/ML EMULSION: Performed by: NURSE ANESTHETIST, CERTIFIED REGISTERED

## 2025-03-19 PROCEDURE — 25010000002 LIDOCAINE PF 2% 2 % SOLUTION: Performed by: NURSE ANESTHETIST, CERTIFIED REGISTERED

## 2025-03-19 PROCEDURE — 88305 TISSUE EXAM BY PATHOLOGIST: CPT | Performed by: INTERNAL MEDICINE

## 2025-03-19 PROCEDURE — 45385 COLONOSCOPY W/LESION REMOVAL: CPT | Performed by: INTERNAL MEDICINE

## 2025-03-19 RX ORDER — SODIUM CHLORIDE 9 MG/ML
500 INJECTION, SOLUTION INTRAVENOUS CONTINUOUS
Status: DISCONTINUED | OUTPATIENT
Start: 2025-03-19 | End: 2025-03-19 | Stop reason: HOSPADM

## 2025-03-19 RX ORDER — SODIUM CHLORIDE 0.9 % (FLUSH) 0.9 %
10 SYRINGE (ML) INJECTION AS NEEDED
Status: DISCONTINUED | OUTPATIENT
Start: 2025-03-19 | End: 2025-03-19 | Stop reason: HOSPADM

## 2025-03-19 RX ORDER — LIDOCAINE HYDROCHLORIDE 10 MG/ML
0.5 INJECTION, SOLUTION EPIDURAL; INFILTRATION; INTRACAUDAL; PERINEURAL ONCE AS NEEDED
Status: DISCONTINUED | OUTPATIENT
Start: 2025-03-19 | End: 2025-03-19 | Stop reason: HOSPADM

## 2025-03-19 RX ORDER — ONDANSETRON 2 MG/ML
4 INJECTION INTRAMUSCULAR; INTRAVENOUS ONCE AS NEEDED
Status: DISCONTINUED | OUTPATIENT
Start: 2025-03-19 | End: 2025-03-19 | Stop reason: HOSPADM

## 2025-03-19 RX ORDER — LIDOCAINE HYDROCHLORIDE 20 MG/ML
INJECTION, SOLUTION EPIDURAL; INFILTRATION; INTRACAUDAL; PERINEURAL AS NEEDED
Status: DISCONTINUED | OUTPATIENT
Start: 2025-03-19 | End: 2025-03-19 | Stop reason: SURG

## 2025-03-19 RX ORDER — PROPOFOL 10 MG/ML
VIAL (ML) INTRAVENOUS AS NEEDED
Status: DISCONTINUED | OUTPATIENT
Start: 2025-03-19 | End: 2025-03-19 | Stop reason: SURG

## 2025-03-19 RX ADMIN — SODIUM CHLORIDE 500 ML: 9 INJECTION, SOLUTION INTRAVENOUS at 07:20

## 2025-03-19 RX ADMIN — PROPOFOL 300 MG: 10 INJECTION, EMULSION INTRAVENOUS at 08:05

## 2025-03-19 RX ADMIN — LIDOCAINE HYDROCHLORIDE 100 MG: 20 INJECTION, SOLUTION EPIDURAL; INFILTRATION; INTRACAUDAL; PERINEURAL at 08:05

## 2025-03-19 NOTE — ANESTHESIA POSTPROCEDURE EVALUATION
"Patient: Daniel Euceda    Procedure Summary       Date: 03/19/25 Room / Location:  [facility] / [facility]    Anesthesia Start: 0802 Anesthesia Stop: 0827    Procedure: COLONOSCOPY WITH ANESTHESIA Diagnosis:       History of adenomatous polyp of colon      Family hx colonic polyps      (History of adenomatous polyp of colon [Z86.0101])      (Family hx colonic polyps [Z83.719])    Surgeons: Carroll Byrnes MD Provider: Maikel Blake CRNA    Anesthesia Type: MAC ASA Status: 2            Anesthesia Type: MAC    Vitals  Vitals Value Taken Time   BP     Temp     Pulse 83 03/19/25 08:27   Resp     SpO2 93 % 03/19/25 08:27   Vitals shown include unfiled device data.        Post Anesthesia Care and Evaluation    Patient location during evaluation: PHASE II  Patient participation: complete - patient participated  Level of consciousness: awake  Pain score: 0  Pain management: adequate    Airway patency: patent  Anesthetic complications: No anesthetic complications  PONV Status: none  Cardiovascular status: acceptable  Respiratory status: acceptable  Hydration status: acceptable    Comments: Blood pressure 135/56, pulse 77, temperature 96.7 °F (35.9 °C), temperature source Temporal, resp. rate 20, height 185.4 cm (73\"), weight 119 kg (263 lb), SpO2 95%.      "

## 2025-03-19 NOTE — H&P
"[facility] Gastroenterology  Pre Procedure History & Physical    Chief Complaint:   Colon polyps    Subjective     HPI:   The patient has a history of colon polyps who presents for exam.    Past Medical History:   Past Medical History:   Diagnosis Date    Asthma     Colon polyp     GERD (gastroesophageal reflux disease)     Hypertension     Prostatitis     Tinnitus        Past Surgical History:  Past Surgical History:   Procedure Laterality Date    COLONOSCOPY  02/07/2011    COLONOSCOPY N/A 8/30/2017    Procedure: COLONOSCOPY WITH ANESTHESIA;  Surgeon: Carroll Byrnes MD;  Location: [facility] ENDOSCOPY;  Service:     COLONOSCOPY N/A 8/26/2020    Procedure: COLONOSCOPY WITH ANESTHESIA;  Surgeon: Carroll Byrnes MD;  Location: [facility] ENDOSCOPY;  Service: Gastroenterology;  Laterality: N/A;  pre hx colon polyp  post polyp, diverticulosis  [facility]    EYE SURGERY         Family History:  Family History   Problem Relation Age of Onset    Colon polyps Mother     Lymphoma Mother     No Known Problems Father     Colon cancer Neg Hx        Social History:   reports that he has quit smoking. His smoking use included cigarettes. He has never used smokeless tobacco. He reports that he does not currently use alcohol. He reports that he does not use drugs.    Medications:   Prior to Admission medications    Medication Sig Start Date End Date Taking? Authorizing Provider   esomeprazole (nexIUM) 20 MG capsule Take 1 capsule by mouth Every Morning Before Breakfast.   Yes ProviderArvind MD   irbesartan (AVAPRO) 150 MG tablet TAKE 1 TABLET BY MOUTH DAILY 7/5/17  Yes ProviderArvind MD       Allergies:  Patient has no known allergies.    ROS:    General: Weight stable  Resp: No SOA  Cardiovascular: No CP    Objective     Blood pressure 135/56, pulse 77, temperature 96.7 °F (35.9 °C), temperature source Temporal, resp. rate 20, height 185.4 cm (73\"), weight 119 kg (263 lb), SpO2 95%.    Physical Exam "   Constitutional: Pt is oriented to person, place, and in no distress.   Cardiovascular: Normal rate, regular rhythm.    Pulmonary/Chest: Effort normal. No respiratory distress.   Abdominal:  Non-distended.  Psychiatric: Mood, memory, affect and judgment appear normal.     Assessment & Plan     Diagnosis:  Colon polyps    Anticipated Surgical Procedure:  Colonoscopy    The risks, benefits, and alternatives of this procedure have been discussed with the patient or the responsible party- the patient understands and agrees to proceed.      EMR Dragon/transcription disclaimer:  Much of this encounter note is electronic transcription/translation of spoken language to printed text.  The electronic translation of spoken language may be erroneous, or at times, nonsensical words or phrases may be inadvertently transcribed.  Although I have reviewed the note for such errors, some may still exist.

## 2025-03-19 NOTE — ANESTHESIA PREPROCEDURE EVALUATION
Anesthesia Evaluation     Patient summary reviewed   no history of anesthetic complications:   NPO Solid Status: > 8 hours  NPO Liquid Status: < 2 hours           Airway   Mallampati: III  TM distance: >3 FB  Neck ROM: full  Dental - normal exam     Pulmonary - negative pulmonary ROS   (+) asthma,  (-) sleep apnea, not a smoker  Cardiovascular   Exercise tolerance: good (4-7 METS)    (+) hypertension  (-) past MI, CAD, dysrhythmias, cardiac stents      Neuro/Psych  (-) seizures, TIA, CVA  GI/Hepatic/Renal/Endo    (+) obesity, GERD, diabetes mellitus (borderline)  (-) liver disease, no renal disease    Musculoskeletal     Abdominal    Substance History      OB/GYN          Other                          Anesthesia Plan    ASA 2     MAC     intravenous induction     Anesthetic plan, risks, benefits, and alternatives have been provided, discussed and informed consent has been obtained with: patient.

## 2025-03-20 LAB
CYTO UR: NORMAL
LAB AP CASE REPORT: NORMAL
Lab: NORMAL
PATH REPORT.FINAL DX SPEC: NORMAL
PATH REPORT.GROSS SPEC: NORMAL

## (undated) DEVICE — ARGYLE YANKAUER BULB TIP WITH VENT: Brand: ARGYLE

## (undated) DEVICE — SNAR POLYP SENSATION MICRO OVL 13 240X40

## (undated) DEVICE — Device: Brand: DEFENDO AIR/WATER/SUCTION AND BIOPSY VALVE

## (undated) DEVICE — THE CHANNEL CLEANING BRUSH IS A NYLON FLEXI BRUSH ATTACHED TO A FLEXIBLE PLASTIC SHEATH DESIGNED TO SAFELY REMOVE DEBRIS FROM FLEXIBLE ENDOSCOPES.

## (undated) DEVICE — CUFF,BP,DISP,1 TUBE,ADULT,HP: Brand: MEDLINE

## (undated) DEVICE — THE SINGLE USE ETRAP – POLYP TRAP IS USED FOR SUCTION RETRIEVAL OF ENDOSCOPICALLY REMOVED POLYPS.: Brand: ETRAP

## (undated) DEVICE — MSK O2 MD CONCENTR A/ LF 7FT 1P/U

## (undated) DEVICE — Device: Brand: SINGLE USE ELECTROSURGICAL SNARE SD-400

## (undated) DEVICE — SENSR O2 OXIMAX FNGR A/ 18IN NONSTR

## (undated) DEVICE — ENDOGATOR AUXILIARY WATER JET CONNECTOR: Brand: ENDOGATOR

## (undated) DEVICE — TBG SMPL FLTR LINE NASL 02/C02 A/ BX/100

## (undated) DEVICE — MASK,OXYGEN,MED CONC,ADLT,7' TUB, UC: Brand: PENDING

## (undated) DEVICE — SNAR POLYP SENSATION JUMBO OVL 30 240X5

## (undated) DEVICE — DEFENDO AIR WATER SUCTION AND BIOPSY VALVE KIT FOR  OLYMPUS: Brand: DEFENDO AIR/WATER/SUCTION AND BIOPSY VALVE

## (undated) DEVICE — YANKAUER,BULB TIP WITH VENT: Brand: ARGYLE